# Patient Record
Sex: FEMALE | Race: WHITE | NOT HISPANIC OR LATINO | Employment: OTHER | URBAN - NONMETROPOLITAN AREA
[De-identification: names, ages, dates, MRNs, and addresses within clinical notes are randomized per-mention and may not be internally consistent; named-entity substitution may affect disease eponyms.]

---

## 2023-12-28 RX ORDER — TRIAMTERENE AND HYDROCHLOROTHIAZIDE 37.5; 25 MG/1; MG/1
1 CAPSULE ORAL EVERY MORNING
COMMUNITY

## 2024-01-12 ENCOUNTER — OFFICE VISIT (OUTPATIENT)
Dept: CARDIOLOGY | Facility: CLINIC | Age: 64
End: 2024-01-12
Payer: COMMERCIAL

## 2024-01-12 VITALS
SYSTOLIC BLOOD PRESSURE: 162 MMHG | DIASTOLIC BLOOD PRESSURE: 88 MMHG | HEIGHT: 61 IN | HEART RATE: 86 BPM | BODY MASS INDEX: 36.06 KG/M2 | WEIGHT: 191 LBS

## 2024-01-12 DIAGNOSIS — I10 ESSENTIAL HYPERTENSION: Primary | ICD-10-CM

## 2024-01-12 DIAGNOSIS — Z13.220 LIPID SCREENING: ICD-10-CM

## 2024-01-12 PROCEDURE — 3077F SYST BP >= 140 MM HG: CPT | Performed by: INTERNAL MEDICINE

## 2024-01-12 PROCEDURE — 99213 OFFICE O/P EST LOW 20 MIN: CPT | Performed by: INTERNAL MEDICINE

## 2024-01-12 PROCEDURE — 3079F DIAST BP 80-89 MM HG: CPT | Performed by: INTERNAL MEDICINE

## 2024-01-12 PROCEDURE — 1036F TOBACCO NON-USER: CPT | Performed by: INTERNAL MEDICINE

## 2024-01-12 NOTE — PROGRESS NOTES
"Subjective   Fidencio Joseph is a 63 y.o. female       Chief Complaint    Follow-up          HPI     Patient returns in follow-up of problems as noted.  In the interim she underwent coronary angiography because of suspicion of coronary disease.  Findings were favorable and this has put her mind at ease.    Blood pressure today is elevated but she is very tearful and upset.  Both her mother and father  within a week of each other.  Because of this is understandable regarding her high blood pressure.  This was discussed in great detail.  I ultimately recommended that she contact the hospice personnel that were tending to her father's care for post modem counseling.    She had not thought about this but now understands this is a resource available to her.  We in the past have noted good blood pressure control on current therapy and because of this we are reluctant to make changes because she has an understandable and explainable reason for elevated blood pressure.  Review of Systems   All other systems reviewed and are negative.         Visit Vitals  /88 (BP Location: Left arm, Patient Position: Sitting)   Pulse 86   Ht 1.549 m (5' 1\")   Wt 86.6 kg (191 lb)   BMI 36.09 kg/m²   Smoking Status Never   BSA 1.93 m²        Objective   Physical Exam  Constitutional:       Appearance: Normal appearance. She is normal weight.   HENT:      Nose: Nose normal.   Neck:      Vascular: No carotid bruit.   Cardiovascular:      Rate and Rhythm: Normal rate.      Pulses: Normal pulses.      Heart sounds: Normal heart sounds.   Pulmonary:      Effort: Pulmonary effort is normal.   Abdominal:      General: Bowel sounds are normal.      Palpations: Abdomen is soft.   Genitourinary:     Rectum: Normal.   Musculoskeletal:         General: Normal range of motion.      Cervical back: Normal range of motion.      Right lower leg: No edema.      Left lower leg: No edema.   Skin:     General: Skin is warm and dry.   Neurological:      " General: No focal deficit present.      Mental Status: She is alert.   Psychiatric:         Mood and Affect: Mood normal.         Behavior: Behavior normal.         Thought Content: Thought content normal.         Judgment: Judgment normal.         Current Medications    Current Outpatient Medications:     triamterene-hydrochlorothiazid (Dyazide) 37.5-25 mg capsule, Take 1 capsule by mouth once daily in the morning., Disp: , Rfl:                      Assessment/Plan   1. Essential hypertension  Good control up until now.  I believe it is elevated today because of significant stress and grief from the death of both her parents.      Scribe Attestation  By signing my name below, IMarissa LPN   , Scribe   attest that this documentation has been prepared under the direction and in the presence of Jose J Erickson MD.

## 2024-01-12 NOTE — LETTER
"2024       No Recipients    Patient: Fidencio Joseph   YOB: 1960   Date of Visit: 2024       Dear Dr. Molina Recipients:    Thank you for referring Fidencio Joseph to me for evaluation. Below are my notes for this consultation.  If you have questions, please do not hesitate to call me. I look forward to following your patient along with you.       Sincerely,     Jose J Erickson MD      CC:   No Recipients  ______________________________________________________________________________________    Subjective   Fidencio Joseph is a 63 y.o. female       Chief Complaint    Follow-up          HPI     Patient returns in follow-up of problems as noted.  In the interim she underwent coronary angiography because of suspicion of coronary disease.  Findings were favorable and this has put her mind at ease.    Blood pressure today is elevated but she is very tearful and upset.  Both her mother and father  within a week of each other.  Because of this is understandable regarding her high blood pressure.  This was discussed in great detail.  I ultimately recommended that she contact the hospice personnel that were tending to her father's care for post modem counseling.    She had not thought about this but now understands this is a resource available to her.  We in the past have noted good blood pressure control on current therapy and because of this we are reluctant to make changes because she has an understandable and explainable reason for elevated blood pressure.  Review of Systems   All other systems reviewed and are negative.         Visit Vitals  /88 (BP Location: Left arm, Patient Position: Sitting)   Pulse 86   Ht 1.549 m (5' 1\")   Wt 86.6 kg (191 lb)   BMI 36.09 kg/m²   Smoking Status Never   BSA 1.93 m²        Objective   Physical Exam  Constitutional:       Appearance: Normal appearance. She is normal weight.   HENT:      Nose: Nose normal.   Neck:      Vascular: No carotid bruit. "   Cardiovascular:      Rate and Rhythm: Normal rate.      Pulses: Normal pulses.      Heart sounds: Normal heart sounds.   Pulmonary:      Effort: Pulmonary effort is normal.   Abdominal:      General: Bowel sounds are normal.      Palpations: Abdomen is soft.   Genitourinary:     Rectum: Normal.   Musculoskeletal:         General: Normal range of motion.      Cervical back: Normal range of motion.      Right lower leg: No edema.      Left lower leg: No edema.   Skin:     General: Skin is warm and dry.   Neurological:      General: No focal deficit present.      Mental Status: She is alert.   Psychiatric:         Mood and Affect: Mood normal.         Behavior: Behavior normal.         Thought Content: Thought content normal.         Judgment: Judgment normal.         Current Medications    Current Outpatient Medications:   •  triamterene-hydrochlorothiazid (Dyazide) 37.5-25 mg capsule, Take 1 capsule by mouth once daily in the morning., Disp: , Rfl:                      Assessment/Plan   1. Essential hypertension  Good control up until now.  I believe it is elevated today because of significant stress and grief from the death of both her parents.      Scribe Attestation  By signing my name below, IMarissa LPN   , Scribe   attest that this documentation has been prepared under the direction and in the presence of Jose J Erickson MD.

## 2024-01-12 NOTE — LETTER
"2024     GROVER Momin-CNP  [address]  [facility] [address]  [facility] [address]    Patient: Fidencio Joseph   YOB: 1960   Date of Visit: 2024       Dear Dr. Ainsley Martin, GROVER-CNP:    Thank you for referring Fidencio Joseph to me for evaluation. Below are my notes for this consultation.  If you have questions, please do not hesitate to call me. I look forward to following your patient along with you.       Sincerely,     Jose J Erickson MD      CC: No Recipients  ______________________________________________________________________________________    Subjective   Fidencio Joseph is a 63 y.o. female       Chief Complaint    Follow-up          HPI     Patient returns in follow-up of problems as noted.  In the interim she underwent coronary angiography because of suspicion of coronary disease.  Findings were favorable and this has put her mind at ease.    Blood pressure today is elevated but she is very tearful and upset.  Both her mother and father  within a week of each other.  Because of this is understandable regarding her high blood pressure.  This was discussed in great detail.  I ultimately recommended that she contact the hospice personnel that were tending to her father's care for post modem counseling.    She had not thought about this but now understands this is a resource available to her.  We in the past have noted good blood pressure control on current therapy and because of this we are reluctant to make changes because she has an understandable and explainable reason for elevated blood pressure.  Review of Systems   All other systems reviewed and are negative.         Visit Vitals  /88 (BP Location: Left arm, Patient Position: Sitting)   Pulse 86   Ht 1.549 m (5' 1\")   Wt 86.6 kg (191 lb)   BMI 36.09 kg/m²   Smoking Status Never   BSA 1.93 m²        Objective   Physical Exam  Constitutional:       Appearance: Normal appearance. She is normal weight.   HENT:      Nose: " Nose normal.   Neck:      Vascular: No carotid bruit.   Cardiovascular:      Rate and Rhythm: Normal rate.      Pulses: Normal pulses.      Heart sounds: Normal heart sounds.   Pulmonary:      Effort: Pulmonary effort is normal.   Abdominal:      General: Bowel sounds are normal.      Palpations: Abdomen is soft.   Genitourinary:     Rectum: Normal.   Musculoskeletal:         General: Normal range of motion.      Cervical back: Normal range of motion.      Right lower leg: No edema.      Left lower leg: No edema.   Skin:     General: Skin is warm and dry.   Neurological:      General: No focal deficit present.      Mental Status: She is alert.   Psychiatric:         Mood and Affect: Mood normal.         Behavior: Behavior normal.         Thought Content: Thought content normal.         Judgment: Judgment normal.         Current Medications    Current Outpatient Medications:   •  triamterene-hydrochlorothiazid (Dyazide) 37.5-25 mg capsule, Take 1 capsule by mouth once daily in the morning., Disp: , Rfl:                      Assessment/Plan   1. Essential hypertension  Good control up until now.  I believe it is elevated today because of significant stress and grief from the death of both her parents.      Scribe Attestation  By signing my name below, IMarissa LPN   , Scribe   attest that this documentation has been prepared under the direction and in the presence of Jose J Erickson MD.

## 2024-07-01 LAB
NON-UH HIE ANION GAP: 9.4 (ref 6–15)
NON-UH HIE BLOOD UREA NITROGEN: 19 MG/DL (ref 7–25)
NON-UH HIE CALCIUM: 10.5 MG/DL (ref 8.6–10.3)
NON-UH HIE CARBON DIOXIDE: 32 MMOL/L (ref 21–31)
NON-UH HIE CHLORIDE: 103 MMOL/L (ref 98–107)
NON-UH HIE CHOL/HDL RATIO: 5.3
NON-UH HIE CHOLESTEROL: 216 MG/DL (ref 140–200)
NON-UH HIE CREATININE: 0.83 MG/DL (ref 0.6–1.2)
NON-UH HIE ESTIMATED GFR: > 60
NON-UH HIE GLUCOSE: 114 MG/DL (ref 70–100)
NON-UH HIE HDL CHOLESTEROL: 41 MG/DL (ref 23–92)
NON-UH HIE LDL CHOLESTEROL,CALCULATED: 148 MG/DL (ref 0–100)
NON-UH HIE POTASSIUM: 4.4 MMOL/L (ref 3.5–5.1)
NON-UH HIE SODIUM: 140 MMOL/L (ref 136–145)
NON-UH HIE TRIGLYCERIDE W/REFLEX: 133 MG/DL (ref 0–149)
NON-UH HIE VLDL CHOLESTEROL: 26 MG/DL

## 2024-07-08 ENCOUNTER — APPOINTMENT (OUTPATIENT)
Dept: CARDIOLOGY | Facility: CLINIC | Age: 64
End: 2024-07-08
Payer: COMMERCIAL

## 2024-07-16 ENCOUNTER — APPOINTMENT (OUTPATIENT)
Dept: CARDIOLOGY | Facility: CLINIC | Age: 64
End: 2024-07-16
Payer: COMMERCIAL

## 2024-07-18 ENCOUNTER — APPOINTMENT (OUTPATIENT)
Dept: CARDIOLOGY | Facility: CLINIC | Age: 64
End: 2024-07-18
Payer: COMMERCIAL

## 2024-08-08 ENCOUNTER — APPOINTMENT (OUTPATIENT)
Dept: CARDIOLOGY | Facility: CLINIC | Age: 64
End: 2024-08-08
Payer: COMMERCIAL

## 2024-08-08 VITALS
DIASTOLIC BLOOD PRESSURE: 70 MMHG | HEART RATE: 94 BPM | WEIGHT: 191 LBS | BODY MASS INDEX: 37.5 KG/M2 | SYSTOLIC BLOOD PRESSURE: 124 MMHG | HEIGHT: 60 IN

## 2024-08-08 DIAGNOSIS — I10 ESSENTIAL HYPERTENSION: ICD-10-CM

## 2024-08-08 PROCEDURE — 99212 OFFICE O/P EST SF 10 MIN: CPT | Performed by: NURSE PRACTITIONER

## 2024-08-08 PROCEDURE — 3078F DIAST BP <80 MM HG: CPT | Performed by: NURSE PRACTITIONER

## 2024-08-08 PROCEDURE — 1036F TOBACCO NON-USER: CPT | Performed by: NURSE PRACTITIONER

## 2024-08-08 PROCEDURE — 3074F SYST BP LT 130 MM HG: CPT | Performed by: NURSE PRACTITIONER

## 2024-08-08 PROCEDURE — 3008F BODY MASS INDEX DOCD: CPT | Performed by: NURSE PRACTITIONER

## 2024-08-08 RX ORDER — TRIAMTERENE AND HYDROCHLOROTHIAZIDE 37.5; 25 MG/1; MG/1
1 CAPSULE ORAL EVERY MORNING
Qty: 90 CAPSULE | Refills: 3 | Status: SHIPPED | OUTPATIENT
Start: 2024-08-08 | End: 2025-08-08

## 2024-08-08 NOTE — PROGRESS NOTES
Subjective:   Fidencio Joseph is a 64 y.o. female with hypertension.  Patient presents to the office amatory steady gait.  Last evaluated in clinic Dr. Erickson January 2024.  At that time she was under increased social stress and anxiety due to recent death of her parents and blood pressure was noted to be elevated.  There were no changes made to her medical regimen.    She presents to the office today where she has been compliant with Maxide dosage.  Blood pressure in the office is optimal.  Recent labs show potassium of 4.4.    Recent lipid profile cholesterol 216, .  Her ACC/AHA 10-year risk or is 6.4%.    Otherwise, she continues to be teary-eyed due to family issues surrounding her parents [place].  Is having some panic and anxiety.  Reviewed recent favorable cardiac catheterization.  Offered much reassurance.    Current Outpatient Medications   Medication Sig Dispense Refill    triamterene-hydrochlorothiazid (Dyazide) 37.5-25 mg capsule Take 1 capsule by mouth once daily in the morning. 90 capsule 3     No current facility-administered medications for this visit.      Hypertension ROS: taking medications as instructed, no medication side effects noted, no TIA's, no chest pain on exertion, no dyspnea on exertion, and no swelling of ankles.   New concerns: none.     Objective:   /70 (BP Location: Right arm, Patient Position: Sitting)   Pulse 94   Ht 1.524 m (5')   Wt 86.6 kg (191 lb)   BMI 37.30 kg/m²    Appearance alert, well appearing, and in no distress.  General exam BP noted to be well controlled today in office, S1, S2 normal, no gallop, no murmur, chest clear, no JVD, no HSM, no edema.   Lab review: labs are reviewed, up to date and normal.     Assessment:    Hypertension well controlled.     Plan:   Orders and follow up as documented in patient record.  Reviewed diet, exercise and weight control.  Recommended sodium restriction.    Jenny Phillips  MSN, APRN-CNP, PMHNP-BC  [facility]  Ej Srivastava    Please excuse any errors in grammar or translation related to this dictation. Voice recognition software was utilized to prepare this document.

## 2024-08-08 NOTE — PATIENT INSTRUCTIONS
Please bring all medicines, vitamins, and herbal supplements with you when you come to the office.    Prescriptions will not be filled unless you are compliant with your follow up appointments or have a follow up appointment scheduled as per instruction of your physician. Refills should be requested at the time of your visit.     PLAN:   Through informed decision making process incorporating patients unique circumstances, the following treatment plan will be initiated:    1.  Prescription drug management of cardiovascular medication for efficacy, adherence to treatment, side effect assessment and polypharmacy. Current treatment clinically warranted and to continue without modifications.    2.  Return for follow-up; in the interim, contact the office if new symptoms arise.  NP annual

## 2025-08-11 ENCOUNTER — APPOINTMENT (OUTPATIENT)
Dept: CARDIOLOGY | Facility: CLINIC | Age: 65
End: 2025-08-11
Payer: COMMERCIAL

## 2025-09-19 ENCOUNTER — APPOINTMENT (OUTPATIENT)
Dept: CARDIOLOGY | Facility: CLINIC | Age: 65
End: 2025-09-19
Payer: COMMERCIAL